# Patient Record
Sex: MALE | Race: WHITE | Employment: FULL TIME | URBAN - METROPOLITAN AREA
[De-identification: names, ages, dates, MRNs, and addresses within clinical notes are randomized per-mention and may not be internally consistent; named-entity substitution may affect disease eponyms.]

---

## 2019-06-15 ENCOUNTER — APPOINTMENT (OUTPATIENT)
Dept: CT IMAGING | Age: 34
End: 2019-06-15
Attending: EMERGENCY MEDICINE
Payer: COMMERCIAL

## 2019-06-15 ENCOUNTER — HOSPITAL ENCOUNTER (OUTPATIENT)
Age: 34
Setting detail: OBSERVATION
Discharge: HOME | End: 2019-06-17
Attending: EMERGENCY MEDICINE | Admitting: HOSPITALIST
Payer: COMMERCIAL

## 2019-06-15 DIAGNOSIS — R10.31 RLQ ABDOMINAL PAIN: Primary | ICD-10-CM

## 2019-06-15 PROBLEM — K50.00 ILEITIS, TERMINAL (HCC): Status: ACTIVE | Noted: 2019-06-15

## 2019-06-15 LAB
ALBUMIN SERPL-MCNC: 4.3 G/DL (ref 3.5–5)
ALBUMIN/GLOB SERPL: 1.5 {RATIO} (ref 1.2–3.5)
ALP SERPL-CCNC: 43 U/L (ref 50–136)
ALT SERPL-CCNC: 23 U/L (ref 12–65)
ANION GAP SERPL CALC-SCNC: 8 MMOL/L (ref 7–16)
AST SERPL-CCNC: 14 U/L (ref 15–37)
BASOPHILS # BLD: 0 K/UL (ref 0–0.2)
BASOPHILS NFR BLD: 1 % (ref 0–2)
BILIRUB SERPL-MCNC: 0.2 MG/DL (ref 0.2–1.1)
BUN SERPL-MCNC: 20 MG/DL (ref 6–23)
CALCIUM SERPL-MCNC: 9.3 MG/DL (ref 8.3–10.4)
CHLORIDE SERPL-SCNC: 107 MMOL/L (ref 98–107)
CO2 SERPL-SCNC: 26 MMOL/L (ref 21–32)
CREAT SERPL-MCNC: 1.26 MG/DL (ref 0.8–1.5)
DIFFERENTIAL METHOD BLD: NORMAL
EOSINOPHIL # BLD: 0.1 K/UL (ref 0–0.8)
EOSINOPHIL NFR BLD: 1 % (ref 0.5–7.8)
ERYTHROCYTE [DISTWIDTH] IN BLOOD BY AUTOMATED COUNT: 12.3 % (ref 11.9–14.6)
GLOBULIN SER CALC-MCNC: 2.9 G/DL (ref 2.3–3.5)
GLUCOSE SERPL-MCNC: 100 MG/DL (ref 65–100)
HCT VFR BLD AUTO: 42 % (ref 41.1–50.3)
HGB BLD-MCNC: 14.6 G/DL (ref 13.6–17.2)
IMM GRANULOCYTES # BLD AUTO: 0 K/UL (ref 0–0.5)
IMM GRANULOCYTES NFR BLD AUTO: 0 % (ref 0–5)
LIPASE SERPL-CCNC: 177 U/L (ref 73–393)
LYMPHOCYTES # BLD: 1.3 K/UL (ref 0.5–4.6)
LYMPHOCYTES NFR BLD: 15 % (ref 13–44)
MCH RBC QN AUTO: 29 PG (ref 26.1–32.9)
MCHC RBC AUTO-ENTMCNC: 34.8 G/DL (ref 31.4–35)
MCV RBC AUTO: 83.3 FL (ref 79.6–97.8)
MONOCYTES # BLD: 0.7 K/UL (ref 0.1–1.3)
MONOCYTES NFR BLD: 8 % (ref 4–12)
NEUTS SEG # BLD: 6.6 K/UL (ref 1.7–8.2)
NEUTS SEG NFR BLD: 76 % (ref 43–78)
NRBC # BLD: 0 K/UL (ref 0–0.2)
PLATELET # BLD AUTO: 234 K/UL (ref 150–450)
PMV BLD AUTO: 11.3 FL (ref 9.4–12.3)
POTASSIUM SERPL-SCNC: 4.2 MMOL/L (ref 3.5–5.1)
PROT SERPL-MCNC: 7.2 G/DL (ref 6.3–8.2)
RBC # BLD AUTO: 5.04 M/UL (ref 4.23–5.6)
SODIUM SERPL-SCNC: 141 MMOL/L (ref 136–145)
WBC # BLD AUTO: 8.7 K/UL (ref 4.3–11.1)

## 2019-06-15 PROCEDURE — 83690 ASSAY OF LIPASE: CPT

## 2019-06-15 PROCEDURE — 81003 URINALYSIS AUTO W/O SCOPE: CPT | Performed by: EMERGENCY MEDICINE

## 2019-06-15 PROCEDURE — 74011636320 HC RX REV CODE- 636/320: Performed by: EMERGENCY MEDICINE

## 2019-06-15 PROCEDURE — 96367 TX/PROPH/DG ADDL SEQ IV INF: CPT

## 2019-06-15 PROCEDURE — 96374 THER/PROPH/DIAG INJ IV PUSH: CPT | Performed by: EMERGENCY MEDICINE

## 2019-06-15 PROCEDURE — 65270000029 HC RM PRIVATE

## 2019-06-15 PROCEDURE — 80053 COMPREHEN METABOLIC PANEL: CPT

## 2019-06-15 PROCEDURE — 85025 COMPLETE CBC W/AUTO DIFF WBC: CPT

## 2019-06-15 PROCEDURE — 96375 TX/PRO/DX INJ NEW DRUG ADDON: CPT | Performed by: EMERGENCY MEDICINE

## 2019-06-15 PROCEDURE — 96365 THER/PROPH/DIAG IV INF INIT: CPT

## 2019-06-15 PROCEDURE — 96361 HYDRATE IV INFUSION ADD-ON: CPT

## 2019-06-15 PROCEDURE — 74177 CT ABD & PELVIS W/CONTRAST: CPT

## 2019-06-15 PROCEDURE — 74011250636 HC RX REV CODE- 250/636: Performed by: EMERGENCY MEDICINE

## 2019-06-15 PROCEDURE — 74011000258 HC RX REV CODE- 258: Performed by: EMERGENCY MEDICINE

## 2019-06-15 PROCEDURE — 74011000258 HC RX REV CODE- 258: Performed by: HOSPITALIST

## 2019-06-15 PROCEDURE — 99284 EMERGENCY DEPT VISIT MOD MDM: CPT | Performed by: EMERGENCY MEDICINE

## 2019-06-15 RX ORDER — SODIUM CHLORIDE 0.9 % (FLUSH) 0.9 %
10 SYRINGE (ML) INJECTION
Status: COMPLETED | OUTPATIENT
Start: 2019-06-15 | End: 2019-06-15

## 2019-06-15 RX ORDER — ONDANSETRON 2 MG/ML
4 INJECTION INTRAMUSCULAR; INTRAVENOUS
Status: DISCONTINUED | OUTPATIENT
Start: 2019-06-15 | End: 2019-06-17 | Stop reason: HOSPADM

## 2019-06-15 RX ORDER — CIPROFLOXACIN 2 MG/ML
400 INJECTION, SOLUTION INTRAVENOUS ONCE
Status: COMPLETED | OUTPATIENT
Start: 2019-06-15 | End: 2019-06-15

## 2019-06-15 RX ORDER — METRONIDAZOLE 500 MG/100ML
500 INJECTION, SOLUTION INTRAVENOUS
Status: COMPLETED | OUTPATIENT
Start: 2019-06-15 | End: 2019-06-15

## 2019-06-15 RX ORDER — METRONIDAZOLE 500 MG/100ML
500 INJECTION, SOLUTION INTRAVENOUS EVERY 8 HOURS
Status: DISCONTINUED | OUTPATIENT
Start: 2019-06-16 | End: 2019-06-17

## 2019-06-15 RX ORDER — HYDROCODONE BITARTRATE AND ACETAMINOPHEN 5; 325 MG/1; MG/1
1 TABLET ORAL
Status: DISCONTINUED | OUTPATIENT
Start: 2019-06-15 | End: 2019-06-15

## 2019-06-15 RX ORDER — SODIUM CHLORIDE 0.9 % (FLUSH) 0.9 %
5-40 SYRINGE (ML) INJECTION EVERY 8 HOURS
Status: DISCONTINUED | OUTPATIENT
Start: 2019-06-15 | End: 2019-06-17 | Stop reason: HOSPADM

## 2019-06-15 RX ORDER — SODIUM CHLORIDE 0.9 % (FLUSH) 0.9 %
5-40 SYRINGE (ML) INJECTION AS NEEDED
Status: DISCONTINUED | OUTPATIENT
Start: 2019-06-15 | End: 2019-06-17 | Stop reason: HOSPADM

## 2019-06-15 RX ORDER — CIPROFLOXACIN 2 MG/ML
400 INJECTION, SOLUTION INTRAVENOUS EVERY 12 HOURS
Status: DISCONTINUED | OUTPATIENT
Start: 2019-06-16 | End: 2019-06-17

## 2019-06-15 RX ORDER — NALOXONE HYDROCHLORIDE 0.4 MG/ML
0.4 INJECTION, SOLUTION INTRAMUSCULAR; INTRAVENOUS; SUBCUTANEOUS AS NEEDED
Status: DISCONTINUED | OUTPATIENT
Start: 2019-06-15 | End: 2019-06-15

## 2019-06-15 RX ORDER — KETOROLAC TROMETHAMINE 15 MG/ML
15 INJECTION, SOLUTION INTRAMUSCULAR; INTRAVENOUS
Status: DISCONTINUED | OUTPATIENT
Start: 2019-06-15 | End: 2019-06-17 | Stop reason: HOSPADM

## 2019-06-15 RX ORDER — KETOROLAC TROMETHAMINE 30 MG/ML
15 INJECTION, SOLUTION INTRAMUSCULAR; INTRAVENOUS
Status: COMPLETED | OUTPATIENT
Start: 2019-06-15 | End: 2019-06-15

## 2019-06-15 RX ORDER — DICYCLOMINE HYDROCHLORIDE 20 MG/1
20 TABLET ORAL
Status: DISCONTINUED | OUTPATIENT
Start: 2019-06-15 | End: 2019-06-17 | Stop reason: HOSPADM

## 2019-06-15 RX ORDER — GABAPENTIN 800 MG/1
800 TABLET ORAL 3 TIMES DAILY
COMMUNITY

## 2019-06-15 RX ORDER — ACETAMINOPHEN 325 MG/1
650 TABLET ORAL
Status: DISCONTINUED | OUTPATIENT
Start: 2019-06-15 | End: 2019-06-17 | Stop reason: HOSPADM

## 2019-06-15 RX ORDER — MORPHINE SULFATE 2 MG/ML
2 INJECTION, SOLUTION INTRAMUSCULAR; INTRAVENOUS
Status: DISCONTINUED | OUTPATIENT
Start: 2019-06-15 | End: 2019-06-15

## 2019-06-15 RX ORDER — DIPHENHYDRAMINE HYDROCHLORIDE 50 MG/ML
12.5 INJECTION, SOLUTION INTRAMUSCULAR; INTRAVENOUS
Status: DISCONTINUED | OUTPATIENT
Start: 2019-06-15 | End: 2019-06-17 | Stop reason: HOSPADM

## 2019-06-15 RX ORDER — DEXTROSE MONOHYDRATE AND SODIUM CHLORIDE 5; .9 G/100ML; G/100ML
125 INJECTION, SOLUTION INTRAVENOUS CONTINUOUS
Status: DISCONTINUED | OUTPATIENT
Start: 2019-06-15 | End: 2019-06-17 | Stop reason: HOSPADM

## 2019-06-15 RX ADMIN — SODIUM CHLORIDE 100 ML: 900 INJECTION, SOLUTION INTRAVENOUS at 21:14

## 2019-06-15 RX ADMIN — KETOROLAC TROMETHAMINE 15 MG: 30 INJECTION, SOLUTION INTRAMUSCULAR at 19:47

## 2019-06-15 RX ADMIN — CIPROFLOXACIN 400 MG: 2 INJECTION, SOLUTION INTRAVENOUS at 22:26

## 2019-06-15 RX ADMIN — DEXTROSE MONOHYDRATE AND SODIUM CHLORIDE 125 ML/HR: 5; .9 INJECTION, SOLUTION INTRAVENOUS at 23:00

## 2019-06-15 RX ADMIN — Medication 10 ML: at 21:14

## 2019-06-15 RX ADMIN — SODIUM CHLORIDE 1000 ML: 900 INJECTION, SOLUTION INTRAVENOUS at 19:47

## 2019-06-15 RX ADMIN — METRONIDAZOLE 500 MG: 500 INJECTION, SOLUTION INTRAVENOUS at 22:26

## 2019-06-15 RX ADMIN — IOPAMIDOL 100 ML: 755 INJECTION, SOLUTION INTRAVENOUS at 21:14

## 2019-06-15 NOTE — ED TRIAGE NOTES
Pt complains of generalized abd pain. Pt holding RLQ. Pt reports the pain started at darryl 0400 and has progressed with nausea, no vomiting. Pt reports having a large bowel movement today and states he still has his appendix.

## 2019-06-15 NOTE — ED PROVIDER NOTES
34 yo male with RLQ abdominal pain. States pain began early this morning in his mid abdomen just below his umbilicus. Denies any fevers or chills, states nausea without vomiting. No pain with urination, no blood in urine, no chest pain or SOB, no further complaints. Abdominal Pain    Associated symptoms include nausea. Pertinent negatives include no fever, no diarrhea, no vomiting, no dysuria, no headaches, no chest pain and no back pain. Past Medical History:   Diagnosis Date    Neurological disorder     migraines       Past Surgical History:   Procedure Laterality Date    HX HEENT      tonsils         History reviewed. No pertinent family history.     Social History     Socioeconomic History    Marital status:      Spouse name: Not on file    Number of children: Not on file    Years of education: Not on file    Highest education level: Not on file   Occupational History    Not on file   Social Needs    Financial resource strain: Not on file    Food insecurity:     Worry: Not on file     Inability: Not on file    Transportation needs:     Medical: Not on file     Non-medical: Not on file   Tobacco Use    Smoking status: Never Smoker   Substance and Sexual Activity    Alcohol use: No    Drug use: Yes     Types: Marijuana    Sexual activity: Not on file   Lifestyle    Physical activity:     Days per week: Not on file     Minutes per session: Not on file    Stress: Not on file   Relationships    Social connections:     Talks on phone: Not on file     Gets together: Not on file     Attends Mosque service: Not on file     Active member of club or organization: Not on file     Attends meetings of clubs or organizations: Not on file     Relationship status: Not on file    Intimate partner violence:     Fear of current or ex partner: Not on file     Emotionally abused: Not on file     Physically abused: Not on file     Forced sexual activity: Not on file   Other Topics Concern    Not on file   Social History Narrative    Not on file         ALLERGIES: Pcn [penicillins] and Sulfa (sulfonamide antibiotics)    Review of Systems   Constitutional: Negative for chills and fever. HENT: Negative for rhinorrhea and sore throat. Eyes: Negative for visual disturbance. Respiratory: Negative for cough and shortness of breath. Cardiovascular: Negative for chest pain and leg swelling. Gastrointestinal: Positive for abdominal pain and nausea. Negative for diarrhea and vomiting. Genitourinary: Negative for dysuria. Musculoskeletal: Negative for back pain and neck pain. Skin: Negative for rash. Neurological: Negative for weakness and headaches. Psychiatric/Behavioral: The patient is not nervous/anxious. Vitals:    06/15/19 1924   BP: 143/74   Pulse: 73   Resp: 17   Temp: 97.6 °F (36.4 °C)   SpO2: 99%   Weight: 90.7 kg (200 lb)   Height: 5' 9\" (1.753 m)            Physical Exam   Constitutional: He is oriented to person, place, and time. He appears well-developed and well-nourished. HENT:   Head: Normocephalic. Right Ear: External ear normal.   Left Ear: External ear normal.   Eyes: Pupils are equal, round, and reactive to light. Conjunctivae and EOM are normal.   Neck: Normal range of motion. Neck supple. No tracheal deviation present. Cardiovascular: Normal rate, regular rhythm, normal heart sounds and intact distal pulses. No murmur heard. Pulmonary/Chest: Effort normal and breath sounds normal. No respiratory distress. Abdominal: Soft. There is tenderness (Pain to palpation in RLQ/Periumbilical region without rebound). No pain to palpation in RUQ or through-out otherwise. Negative vazquez sign. Musculoskeletal: Normal range of motion. Neurological: He is alert and oriented to person, place, and time. No cranial nerve deficit. Skin: No rash noted. Nursing note and vitals reviewed.        MDM       Procedures    Recent Results (from the past 12 hour(s))   CBC WITH AUTOMATED DIFF    Collection Time: 06/15/19  7:56 PM   Result Value Ref Range    WBC 8.7 4.3 - 11.1 K/uL    RBC 5.04 4.23 - 5.6 M/uL    HGB 14.6 13.6 - 17.2 g/dL    HCT 42.0 41.1 - 50.3 %    MCV 83.3 79.6 - 97.8 FL    MCH 29.0 26.1 - 32.9 PG    MCHC 34.8 31.4 - 35.0 g/dL    RDW 12.3 11.9 - 14.6 %    PLATELET 472 291 - 338 K/uL    MPV 11.3 9.4 - 12.3 FL    ABSOLUTE NRBC 0.00 0.0 - 0.2 K/uL    DF AUTOMATED      NEUTROPHILS 76 43 - 78 %    LYMPHOCYTES 15 13 - 44 %    MONOCYTES 8 4.0 - 12.0 %    EOSINOPHILS 1 0.5 - 7.8 %    BASOPHILS 1 0.0 - 2.0 %    IMMATURE GRANULOCYTES 0 0.0 - 5.0 %    ABS. NEUTROPHILS 6.6 1.7 - 8.2 K/UL    ABS. LYMPHOCYTES 1.3 0.5 - 4.6 K/UL    ABS. MONOCYTES 0.7 0.1 - 1.3 K/UL    ABS. EOSINOPHILS 0.1 0.0 - 0.8 K/UL    ABS. BASOPHILS 0.0 0.0 - 0.2 K/UL    ABS. IMM. GRANS. 0.0 0.0 - 0.5 K/UL   METABOLIC PANEL, COMPREHENSIVE    Collection Time: 06/15/19  7:56 PM   Result Value Ref Range    Sodium 141 136 - 145 mmol/L    Potassium 4.2 3.5 - 5.1 mmol/L    Chloride 107 98 - 107 mmol/L    CO2 26 21 - 32 mmol/L    Anion gap 8 7 - 16 mmol/L    Glucose 100 65 - 100 mg/dL    BUN 20 6 - 23 MG/DL    Creatinine 1.26 0.8 - 1.5 MG/DL    GFR est AA >60 >60 ml/min/1.73m2    GFR est non-AA >60 >60 ml/min/1.73m2    Calcium 9.3 8.3 - 10.4 MG/DL    Bilirubin, total 0.2 0.2 - 1.1 MG/DL    ALT (SGPT) 23 12 - 65 U/L    AST (SGOT) 14 (L) 15 - 37 U/L    Alk. phosphatase 43 (L) 50 - 136 U/L    Protein, total 7.2 6.3 - 8.2 g/dL    Albumin 4.3 3.5 - 5.0 g/dL    Globulin 2.9 2.3 - 3.5 g/dL    A-G Ratio 1.5 1.2 - 3.5     LIPASE    Collection Time: 06/15/19  7:56 PM   Result Value Ref Range    Lipase 177 73 - 393 U/L     Ct Abd Pelv W Cont    Result Date: 6/15/2019  CT abdomen and pelvis with contrast COMPARISON: None . INDICATION: Generalized abdominal pain. Right lower quadrant pain. . TECHNIQUE: CT imaging was performed of the abdomen and pelvis following the uncomplicated administration of intravenous contrast (Isovue 370, 100 mL). Oral contrast was administered. Radiation dose reduction techniques were used for this study:  Our CT scanners use one or all of the following: Automated exposure control, adjustment of the mA and/or kVp according to patient's size, iterative reconstruction. FINDINGS: Visualized lung bases are unremarkable. Abdomen findings: There is mild periportal edema, nonspecific. No focal liver lesion. Gallbladder is contracted. The pancreas, spleen, adrenal glands, and the left kidney are unremarkable. There is a tiny right renal cyst. No hydronephrosis. Abdominal aorta is normal in course and caliber. Stomach is normal in contour. There is no evidence of lymphadenopathy. Pelvic findings: There is subtle focal bowel wall thickening at the ileocecal junction, resulting in narrowing. There is a short segment bowel dilatation of the terminal ileum, containing feces. Appendix appears within normal limits, though not well visualized. The colon is normal in course and caliber with moderate stool volume. Urinary bladder is normal in contour. There is no free air or free fluid. Surrounding bones are intact. IMPRESSION: 1. Subtle mild wall thickening at the ileocecal junction, resulting in luminal narrowing. Findings suspicious for mild terminal ileitis. Short segment dilated terminal ileum, containing feces (small bowel feces sign), suggestive of partial obstruction. 2. Appendix appears within normal limits, although not well visualized. Negative for diverticulitis or hydronephrosis. 36 yo male with abdominal pain:       Discussed with Dr. Kaitlin Bowers who is on for surgery and states non-surgical at this time and requests admission to hospitalist with GI consult and will follow along. Started Cipro/flagyl in ED.

## 2019-06-16 LAB
AMPHET UR QL SCN: NEGATIVE
ANION GAP SERPL CALC-SCNC: 7 MMOL/L (ref 7–16)
BARBITURATES UR QL SCN: NEGATIVE
BASOPHILS # BLD: 0 K/UL (ref 0–0.2)
BASOPHILS NFR BLD: 0 % (ref 0–2)
BENZODIAZ UR QL: NEGATIVE
BUN SERPL-MCNC: 16 MG/DL (ref 6–23)
CALCIUM SERPL-MCNC: 9 MG/DL (ref 8.3–10.4)
CANNABINOIDS UR QL SCN: NEGATIVE
CHLORIDE SERPL-SCNC: 108 MMOL/L (ref 98–107)
CO2 SERPL-SCNC: 27 MMOL/L (ref 21–32)
COCAINE UR QL SCN: NEGATIVE
CREAT SERPL-MCNC: 0.92 MG/DL (ref 0.8–1.5)
CRP SERPL-MCNC: <0.3 MG/DL (ref 0–0.9)
DIFFERENTIAL METHOD BLD: ABNORMAL
EOSINOPHIL # BLD: 0.1 K/UL (ref 0–0.8)
EOSINOPHIL NFR BLD: 2 % (ref 0.5–7.8)
ERYTHROCYTE [DISTWIDTH] IN BLOOD BY AUTOMATED COUNT: 12.3 % (ref 11.9–14.6)
ERYTHROCYTE [SEDIMENTATION RATE] IN BLOOD: 6 MM/HR (ref 0–15)
GLUCOSE SERPL-MCNC: 114 MG/DL (ref 65–100)
HCT VFR BLD AUTO: 39.3 % (ref 41.1–50.3)
HGB BLD-MCNC: 13.7 G/DL (ref 13.6–17.2)
IMM GRANULOCYTES # BLD AUTO: 0 K/UL (ref 0–0.5)
IMM GRANULOCYTES NFR BLD AUTO: 0 % (ref 0–5)
LACTATE SERPL-SCNC: 0.9 MMOL/L (ref 0.4–2)
LYMPHOCYTES # BLD: 2 K/UL (ref 0.5–4.6)
LYMPHOCYTES NFR BLD: 29 % (ref 13–44)
MAGNESIUM SERPL-MCNC: 2 MG/DL (ref 1.8–2.4)
MCH RBC QN AUTO: 29 PG (ref 26.1–32.9)
MCHC RBC AUTO-ENTMCNC: 34.9 G/DL (ref 31.4–35)
MCV RBC AUTO: 83.3 FL (ref 79.6–97.8)
METHADONE UR QL: NEGATIVE
MONOCYTES # BLD: 0.7 K/UL (ref 0.1–1.3)
MONOCYTES NFR BLD: 10 % (ref 4–12)
NEUTS SEG # BLD: 4 K/UL (ref 1.7–8.2)
NEUTS SEG NFR BLD: 58 % (ref 43–78)
NRBC # BLD: 0 K/UL (ref 0–0.2)
OPIATES UR QL: NEGATIVE
PCP UR QL: NEGATIVE
PLATELET # BLD AUTO: 182 K/UL (ref 150–450)
PMV BLD AUTO: 10.8 FL (ref 9.4–12.3)
POTASSIUM SERPL-SCNC: 3.8 MMOL/L (ref 3.5–5.1)
RBC # BLD AUTO: 4.72 M/UL (ref 4.23–5.6)
SODIUM SERPL-SCNC: 142 MMOL/L (ref 136–145)
WBC # BLD AUTO: 6.9 K/UL (ref 4.3–11.1)

## 2019-06-16 PROCEDURE — 74011250636 HC RX REV CODE- 250/636: Performed by: HOSPITALIST

## 2019-06-16 PROCEDURE — 80307 DRUG TEST PRSMV CHEM ANLYZR: CPT

## 2019-06-16 PROCEDURE — 83605 ASSAY OF LACTIC ACID: CPT

## 2019-06-16 PROCEDURE — 36415 COLL VENOUS BLD VENIPUNCTURE: CPT

## 2019-06-16 PROCEDURE — 96366 THER/PROPH/DIAG IV INF ADDON: CPT

## 2019-06-16 PROCEDURE — 80048 BASIC METABOLIC PNL TOTAL CA: CPT

## 2019-06-16 PROCEDURE — 85025 COMPLETE CBC W/AUTO DIFF WBC: CPT

## 2019-06-16 PROCEDURE — 74011000258 HC RX REV CODE- 258: Performed by: HOSPITALIST

## 2019-06-16 PROCEDURE — 77030020245 HC SOL INJ 5% D/0.9%NACL

## 2019-06-16 PROCEDURE — 96375 TX/PRO/DX INJ NEW DRUG ADDON: CPT

## 2019-06-16 PROCEDURE — 85652 RBC SED RATE AUTOMATED: CPT

## 2019-06-16 PROCEDURE — 96361 HYDRATE IV INFUSION ADD-ON: CPT

## 2019-06-16 PROCEDURE — 86140 C-REACTIVE PROTEIN: CPT

## 2019-06-16 PROCEDURE — 99218 HC RM OBSERVATION: CPT

## 2019-06-16 PROCEDURE — 83735 ASSAY OF MAGNESIUM: CPT

## 2019-06-16 RX ORDER — METOCLOPRAMIDE HYDROCHLORIDE 5 MG/ML
5 INJECTION INTRAMUSCULAR; INTRAVENOUS EVERY 6 HOURS
Status: DISCONTINUED | OUTPATIENT
Start: 2019-06-16 | End: 2019-06-16

## 2019-06-16 RX ADMIN — CIPROFLOXACIN 400 MG: 2 INJECTION, SOLUTION INTRAVENOUS at 10:59

## 2019-06-16 RX ADMIN — METOCLOPRAMIDE 5 MG: 5 INJECTION, SOLUTION INTRAMUSCULAR; INTRAVENOUS at 11:00

## 2019-06-16 RX ADMIN — METRONIDAZOLE 500 MG: 500 INJECTION, SOLUTION INTRAVENOUS at 07:24

## 2019-06-16 RX ADMIN — DEXTROSE MONOHYDRATE AND SODIUM CHLORIDE 125 ML/HR: 5; .9 INJECTION, SOLUTION INTRAVENOUS at 11:04

## 2019-06-16 RX ADMIN — CIPROFLOXACIN 400 MG: 2 INJECTION, SOLUTION INTRAVENOUS at 21:04

## 2019-06-16 RX ADMIN — METRONIDAZOLE 500 MG: 500 INJECTION, SOLUTION INTRAVENOUS at 14:40

## 2019-06-16 RX ADMIN — METRONIDAZOLE 500 MG: 500 INJECTION, SOLUTION INTRAVENOUS at 22:43

## 2019-06-16 RX ADMIN — DEXTROSE MONOHYDRATE AND SODIUM CHLORIDE 125 ML/HR: 5; .9 INJECTION, SOLUTION INTRAVENOUS at 21:04

## 2019-06-16 NOTE — PROGRESS NOTES
TRANSFER - IN REPORT:    Verbal report received from Isabel(name) on Andrew Warner  being received from ED(unit) for routine progression of care      Report consisted of patients Situation, Background, Assessment and   Recommendations(SBAR). Information from the following report(s) SBAR, Kardex, ED Summary, [facility] and Recent Results was reviewed with the receiving nurse. Opportunity for questions and clarification was provided. Assessment completed upon patients arrival to unit and care assumed.

## 2019-06-16 NOTE — PROGRESS NOTES
Assessment completed and dodcumented. Lung sounds clear. Heart sounds regular. Abdomen soft and tender. Bowel sounds hypoactive. Currently sleeping. Will continue to monitor with hourly rounding.

## 2019-06-16 NOTE — ED NOTES
TRANSFER - OUT REPORT:    Verbal report given to Jacqueline Turner RN(name) on Mami Brown  being transferred to Med Surg(unit) for routine progression of care       Report consisted of patients Situation, Background, Assessment and   Recommendations(SBAR). Information from the following report(s) SBAR, Kardex, ED Summary, Accordion, Recent Results and Med Rec Status was reviewed with the receiving nurse. Lines:   Peripheral IV 06/15/19 Left (Active)   Site Assessment Clean, dry, & intact 6/15/2019  7:55 PM   Dressing Status Clean, dry, & intact 6/15/2019  7:55 PM   Alcohol Cap Used Yes 6/15/2019  7:55 PM        Opportunity for questions and clarification was provided.       Patient transported with:   [facility]

## 2019-06-16 NOTE — PROGRESS NOTES
Hospitalist     Admit Date:  6/15/2019  7:27 PM   Name:  Gail Chvaez   Age:  35 y.o.  :  1985   MRN:  [id number]   PCP:  Leo Hernandez MD  Treatment Team: Attending Provider: Dagoberto Pickens MD; Consulting Provider: Tino Crain MD    subj   Patient is a 36 y/o male with no chronic medical problems who presents with acute onset RLQ pain starting at 4 am. Felt as though he had a knot and he needed to move to relieve the pain. Pain has progressed throughout the day. Has nausea but no vomiting. No diarrhea or blood in stool. No fever. He is afebrile with a WBC ct of 8.7 CT shows findings suspicious for mild terminal ileitis and suggestive of partial obstruction. He has had no suspect foods. Appendix appears within normal limits although not well visualized. He was started on IV antibiotics and Hospitalist service consulted for admission. He has no personal or family history for inflammatory bowel disease. Today, no BM or passing gas    Objective:     Patient Vitals for the past 24 hrs:   Temp Pulse Resp BP SpO2   19 1213 97.9 °F (36.6 °C) 92 16 106/61 97 %   19 0815 97.4 °F (36.3 °C) 60 16 113/73 96 %   19 0420 97.6 °F (36.4 °C) 72 16 115/75 99 %   06/15/19 2300 97.8 °F (36.6 °C) 74 18 118/78 98 %   06/15/19 2231 97.9 °F (36.6 °C) 70 14 121/72 100 %   06/15/19 2105    120/55    06/15/19 1924 97.6 °F (36.4 °C) 73 17 143/74 99 %     Oxygen Therapy  O2 Sat (%): 97 % (19 1213)  O2 Device: Room air (06/15/19 1924)    Intake/Output Summary (Last 24 hours) at 2019 1632  Last data filed at 2019 1405  Gross per 24 hour   Intake 1998 ml   Output 250 ml   Net 1748 ml       Physical Exam:  General:    Well nourished. Alert. Eyes:   Normal sclera. Extraocular movements intact. ENT:  Normocephalic, atraumatic. Moist mucous membranes  CV:   RRR. No murmur, rub, or gallop. Lungs:  CTAB. No wheezing, rhonchi, or rales.   Abdomen: Soft,diminished bs, mildly tender RLQ  Extremities: Warm and dry. No cyanosis or edema. Neurologic: CN II-XII grossly intact. Sensation intact. Skin:     No rashes or jaundice. No wounds. Psych:  Normal mood and affect. I reviewed the labs, imaging, EKGs, telemetry, and other studies done this admission. Data Review:   Recent Results (from the past 24 hour(s))   CBC WITH AUTOMATED DIFF    Collection Time: 06/15/19  7:56 PM   Result Value Ref Range    WBC 8.7 4.3 - 11.1 K/uL    RBC 5.04 4.23 - 5.6 M/uL    HGB 14.6 13.6 - 17.2 g/dL    HCT 42.0 41.1 - 50.3 %    MCV 83.3 79.6 - 97.8 FL    MCH 29.0 26.1 - 32.9 PG    MCHC 34.8 31.4 - 35.0 g/dL    RDW 12.3 11.9 - 14.6 %    PLATELET 482 263 - 346 K/uL    MPV 11.3 9.4 - 12.3 FL    ABSOLUTE NRBC 0.00 0.0 - 0.2 K/uL    DF AUTOMATED      NEUTROPHILS 76 43 - 78 %    LYMPHOCYTES 15 13 - 44 %    MONOCYTES 8 4.0 - 12.0 %    EOSINOPHILS 1 0.5 - 7.8 %    BASOPHILS 1 0.0 - 2.0 %    IMMATURE GRANULOCYTES 0 0.0 - 5.0 %    ABS. NEUTROPHILS 6.6 1.7 - 8.2 K/UL    ABS. LYMPHOCYTES 1.3 0.5 - 4.6 K/UL    ABS. MONOCYTES 0.7 0.1 - 1.3 K/UL    ABS. EOSINOPHILS 0.1 0.0 - 0.8 K/UL    ABS. BASOPHILS 0.0 0.0 - 0.2 K/UL    ABS. IMM. GRANS. 0.0 0.0 - 0.5 K/UL   METABOLIC PANEL, COMPREHENSIVE    Collection Time: 06/15/19  7:56 PM   Result Value Ref Range    Sodium 141 136 - 145 mmol/L    Potassium 4.2 3.5 - 5.1 mmol/L    Chloride 107 98 - 107 mmol/L    CO2 26 21 - 32 mmol/L    Anion gap 8 7 - 16 mmol/L    Glucose 100 65 - 100 mg/dL    BUN 20 6 - 23 MG/DL    Creatinine 1.26 0.8 - 1.5 MG/DL    GFR est AA >60 >60 ml/min/1.73m2    GFR est non-AA >60 >60 ml/min/1.73m2    Calcium 9.3 8.3 - 10.4 MG/DL    Bilirubin, total 0.2 0.2 - 1.1 MG/DL    ALT (SGPT) 23 12 - 65 U/L    AST (SGOT) 14 (L) 15 - 37 U/L    Alk.  phosphatase 43 (L) 50 - 136 U/L    Protein, total 7.2 6.3 - 8.2 g/dL    Albumin 4.3 3.5 - 5.0 g/dL    Globulin 2.9 2.3 - 3.5 g/dL    A-G Ratio 1.5 1.2 - 3.5     LIPASE    Collection Time: 06/15/19  7:56 PM Result Value Ref Range    Lipase 177 73 - 393 U/L   CBC WITH AUTOMATED DIFF    Collection Time: 06/16/19  5:03 AM   Result Value Ref Range    WBC 6.9 4.3 - 11.1 K/uL    RBC 4.72 4.23 - 5.6 M/uL    HGB 13.7 13.6 - 17.2 g/dL    HCT 39.3 (L) 41.1 - 50.3 %    MCV 83.3 79.6 - 97.8 FL    MCH 29.0 26.1 - 32.9 PG    MCHC 34.9 31.4 - 35.0 g/dL    RDW 12.3 11.9 - 14.6 %    PLATELET 570 942 - 321 K/uL    MPV 10.8 9.4 - 12.3 FL    ABSOLUTE NRBC 0.00 0.0 - 0.2 K/uL    DF AUTOMATED      NEUTROPHILS 58 43 - 78 %    LYMPHOCYTES 29 13 - 44 %    MONOCYTES 10 4.0 - 12.0 %    EOSINOPHILS 2 0.5 - 7.8 %    BASOPHILS 0 0.0 - 2.0 %    IMMATURE GRANULOCYTES 0 0.0 - 5.0 %    ABS. NEUTROPHILS 4.0 1.7 - 8.2 K/UL    ABS. LYMPHOCYTES 2.0 0.5 - 4.6 K/UL    ABS. MONOCYTES 0.7 0.1 - 1.3 K/UL    ABS. EOSINOPHILS 0.1 0.0 - 0.8 K/UL    ABS. BASOPHILS 0.0 0.0 - 0.2 K/UL    ABS. IMM.  GRANS. 0.0 0.0 - 0.5 K/UL   MAGNESIUM    Collection Time: 06/16/19  5:03 AM   Result Value Ref Range    Magnesium 2.0 1.8 - 2.4 mg/dL   LACTIC ACID    Collection Time: 06/16/19  5:03 AM   Result Value Ref Range    Lactic acid 0.9 0.4 - 2.0 MMOL/L   METABOLIC PANEL, BASIC    Collection Time: 06/16/19  5:03 AM   Result Value Ref Range    Sodium 142 136 - 145 mmol/L    Potassium 3.8 3.5 - 5.1 mmol/L    Chloride 108 (H) 98 - 107 mmol/L    CO2 27 21 - 32 mmol/L    Anion gap 7 7 - 16 mmol/L    Glucose 114 (H) 65 - 100 mg/dL    BUN 16 6 - 23 MG/DL    Creatinine 0.92 0.8 - 1.5 MG/DL    GFR est AA >60 >60 ml/min/1.73m2    GFR est non-AA >60 >60 ml/min/1.73m2    Calcium 9.0 8.3 - 10.4 MG/DL   C REACTIVE PROTEIN, QT    Collection Time: 06/16/19  5:03 AM   Result Value Ref Range    C-Reactive protein <0.3 0.0 - 0.9 mg/dL   SED RATE, AUTOMATED    Collection Time: 06/16/19  5:03 AM   Result Value Ref Range    Sed rate, automated 6 0 - 15 mm/hr   DRUG SCREEN, URINE    Collection Time: 06/16/19  3:18 PM   Result Value Ref Range    PCP(PHENCYCLIDINE) NEGATIVE       BENZODIAZEPINES NEGATIVE       COCAINE NEGATIVE       AMPHETAMINES NEGATIVE       METHADONE NEGATIVE       THC (TH-CANNABINOL) NEGATIVE       OPIATES NEGATIVE       BARBITURATES NEGATIVE          Imaging Studies:  CXR Results  (Last 48 hours)    None        CT Results  (Last 48 hours)               06/15/19 2115  CT ABD PELV W CONT Final result    Impression:  IMPRESSION:       1. Subtle mild wall thickening at the ileocecal junction, resulting in luminal   narrowing. Findings suspicious for mild terminal ileitis. Short segment dilated   terminal ileum, containing feces (small bowel feces sign), suggestive of partial   obstruction. 2. Appendix appears within normal limits, although not well visualized. Negative   for diverticulitis or hydronephrosis. Narrative:  CT abdomen and pelvis with contrast        COMPARISON: None . INDICATION: Generalized abdominal pain. Right lower quadrant pain. .       TECHNIQUE: CT imaging was performed of the abdomen and pelvis following the   uncomplicated administration of intravenous contrast (Isovue 370, 100 mL). Oral   contrast was administered. Radiation dose reduction techniques were used for   this study:  Our CT scanners use one or all of the following: Automated exposure   control, adjustment of the mA and/or kVp according to patient's size, iterative   reconstruction. FINDINGS:       Visualized lung bases are unremarkable. Abdomen findings: There is mild periportal edema, nonspecific. No focal liver lesion. Gallbladder   is contracted. The pancreas, spleen, adrenal glands, and the left kidney are   unremarkable. There is a tiny right renal cyst. No hydronephrosis. Abdominal aorta is normal in course and caliber. Stomach is normal in contour. There is no evidence of lymphadenopathy. Pelvic findings: There is subtle focal bowel wall thickening at the ileocecal junction, resulting   in narrowing.  There is a short segment bowel dilatation of the terminal ileum,   containing feces. Appendix appears within normal limits, though not well   visualized. The colon is normal in course and caliber with moderate stool   volume. Urinary bladder is normal in contour. There is no free air or free fluid. Surrounding bones are intact.                      Assessment and Plan:     Hospital Problems as of 6/16/2019 Never Reviewed          Codes Class Noted - Resolved POA    * (Principal) [facility] ([facility] ICD-10-CM: K50.00  ICD-9-CM: 555.0  6/15/2019 - Present Yes              PLAN:    abx  Liquid diet  Pain control  IVF  Colonoscopy as OP    Dispo: home in 1-2 days    Signed:  Pablo Cortez MD

## 2019-06-16 NOTE — PROGRESS NOTES
06/15/19 2300   Dual Skin Pressure Injury Assessment   Dual Skin Pressure Injury Assessment WDL   Second Care Provider (Based on [address] [place] [address]) Chasidy Umanzor RN   Skin Integumentary   Skin Integumentary (WDL) WDL   Skin Color Appropriate for ethnicity   Skin Condition/Temp Warm;Dry   Skin Integrity Intact; Tattoos (comment)   Turgor Non-tenting

## 2019-06-16 NOTE — H&P
Hospitalist H&P/Consult Note     Admit Date:  6/15/2019  7:27 PM   Name:  Porsha Mercer   Age:  35 y.o.  :  1985   MRN:  [id number]   PCP:  Unruly Patel MD  Treatment Team: Attending Provider: Vicenta Alvarez MD    HPI:   Patient is a 36 y/o male with no chronic medical problems who presents with acute onset RLQ pain starting at 4 am. Felt as though he had a knot and he needed to move to relieve the pain. Pain has progressed throughout the day. Has nausea but no vomiting. No diarrhea or blood in stool. No fever. He is afebrile with a WBC ct of 8.7 CT shows findings suspicious for mild terminal ileitis and suggestive of partial obstruction. He has had no suspect foods. Appendix appears within normal limits although not well visualized. He was started on IV antibiotics and Hospitalist service consulted for admission. He has no personal or family history for inflammatory bowel disease. 10 systems reviewed and negative except as noted in HPI. Past Medical History:   Diagnosis Date    Neurological disorder     migraines      Past Surgical History:   Procedure Laterality Date    HX HEENT      tonsils      Prior to Admission Medications   Prescriptions Last Dose Informant Patient Reported? Taking?   gabapentin (NEURONTIN) 800 mg tablet   Yes Yes   Sig: Take 800 mg by mouth three (3) times daily. Facility-Administered Medications: None     Allergies   Allergen Reactions    Pcn [Penicillins] Hives    Sulfa (Sulfonamide Antibiotics) Hives      Social History     Tobacco Use    Smoking status: Never Smoker   Substance Use Topics    Alcohol use: No      History reviewed. No pertinent family history. no fam hx of Crohn's dz or UC    There is no immunization history on file for this patient.     Objective:     Patient Vitals for the past 24 hrs:   Temp Pulse Resp BP SpO2   06/15/19 2300 97.8 °F (36.6 °C) 74 18 118/78 98 %   06/15/19 2231 97.9 °F (36.6 °C) 70 14 121/72 100 %   06/15/19 2105    120/55    06/15/19 1924 97.6 °F (36.4 °C) 73 17 143/74 99 %     Oxygen Therapy  O2 Sat (%): 98 % (06/15/19 2300)  O2 Device: Room air (06/15/19 1924)    Intake/Output Summary (Last 24 hours) at 6/15/2019 3145  Last data filed at 6/15/2019 2237  Gross per 24 hour   Intake 1200 ml   Output    Net 1200 ml       Physical Exam:  General:    Well nourished. Alert. Eyes:   Normal sclera. Extraocular movements intact. ENT:  Normocephalic, atraumatic. Moist mucous membranes  CV:   RRR. No murmur, rub, or gallop. Lungs:  CTAB. No wheezing, rhonchi, or rales. Abdomen: Soft, tender to palpation RLQ  Extremities: Warm and dry. No cyanosis or edema. Neurologic: CN II-XII grossly intact. Sensation intact. Skin:     No rashes or jaundice. No wounds. Psych:  Normal mood and affect. I reviewed the labs, imaging, EKGs, telemetry, and other studies done this admission. Data Review:   Recent Results (from the past 24 hour(s))   CBC WITH AUTOMATED DIFF    Collection Time: 06/15/19  7:56 PM   Result Value Ref Range    WBC 8.7 4.3 - 11.1 K/uL    RBC 5.04 4.23 - 5.6 M/uL    HGB 14.6 13.6 - 17.2 g/dL    HCT 42.0 41.1 - 50.3 %    MCV 83.3 79.6 - 97.8 FL    MCH 29.0 26.1 - 32.9 PG    MCHC 34.8 31.4 - 35.0 g/dL    RDW 12.3 11.9 - 14.6 %    PLATELET 348 669 - 459 K/uL    MPV 11.3 9.4 - 12.3 FL    ABSOLUTE NRBC 0.00 0.0 - 0.2 K/uL    DF AUTOMATED      NEUTROPHILS 76 43 - 78 %    LYMPHOCYTES 15 13 - 44 %    MONOCYTES 8 4.0 - 12.0 %    EOSINOPHILS 1 0.5 - 7.8 %    BASOPHILS 1 0.0 - 2.0 %    IMMATURE GRANULOCYTES 0 0.0 - 5.0 %    ABS. NEUTROPHILS 6.6 1.7 - 8.2 K/UL    ABS. LYMPHOCYTES 1.3 0.5 - 4.6 K/UL    ABS. MONOCYTES 0.7 0.1 - 1.3 K/UL    ABS. EOSINOPHILS 0.1 0.0 - 0.8 K/UL    ABS. BASOPHILS 0.0 0.0 - 0.2 K/UL    ABS. IMM.  GRANS. 0.0 0.0 - 0.5 K/UL   METABOLIC PANEL, COMPREHENSIVE    Collection Time: 06/15/19  7:56 PM   Result Value Ref Range    Sodium 141 136 - 145 mmol/L    Potassium 4.2 3.5 - 5.1 mmol/L Chloride 107 98 - 107 mmol/L    CO2 26 21 - 32 mmol/L    Anion gap 8 7 - 16 mmol/L    Glucose 100 65 - 100 mg/dL    BUN 20 6 - 23 MG/DL    Creatinine 1.26 0.8 - 1.5 MG/DL    GFR est AA >60 >60 ml/min/1.73m2    GFR est non-AA >60 >60 ml/min/1.73m2    Calcium 9.3 8.3 - 10.4 MG/DL    Bilirubin, total 0.2 0.2 - 1.1 MG/DL    ALT (SGPT) 23 12 - 65 U/L    AST (SGOT) 14 (L) 15 - 37 U/L    Alk. phosphatase 43 (L) 50 - 136 U/L    Protein, total 7.2 6.3 - 8.2 g/dL    Albumin 4.3 3.5 - 5.0 g/dL    Globulin 2.9 2.3 - 3.5 g/dL    A-G Ratio 1.5 1.2 - 3.5     LIPASE    Collection Time: 06/15/19  7:56 PM   Result Value Ref Range    Lipase 177 73 - 393 U/L       Imaging Studies:  CXR Results  (Last 48 hours)    None        CT Results  (Last 48 hours)               06/15/19 2115  CT ABD PELV W CONT Final result    Impression:  IMPRESSION:       1. Subtle mild wall thickening at the ileocecal junction, resulting in luminal   narrowing. Findings suspicious for mild terminal ileitis. Short segment dilated   terminal ileum, containing feces (small bowel feces sign), suggestive of partial   obstruction. 2. Appendix appears within normal limits, although not well visualized. Negative   for diverticulitis or hydronephrosis. Narrative:  CT abdomen and pelvis with contrast        COMPARISON: None . INDICATION: Generalized abdominal pain. Right lower quadrant pain. .       TECHNIQUE: CT imaging was performed of the abdomen and pelvis following the   uncomplicated administration of intravenous contrast (Isovue 370, 100 mL). Oral   contrast was administered. Radiation dose reduction techniques were used for   this study:  Our CT scanners use one or all of the following: Automated exposure   control, adjustment of the mA and/or kVp according to patient's size, iterative   reconstruction. FINDINGS:       Visualized lung bases are unremarkable. Abdomen findings: There is mild periportal edema, nonspecific.  No focal liver lesion. Gallbladder   is contracted. The pancreas, spleen, adrenal glands, and the left kidney are   unremarkable. There is a tiny right renal cyst. No hydronephrosis. Abdominal aorta is normal in course and caliber. Stomach is normal in contour. There is no evidence of lymphadenopathy. Pelvic findings: There is subtle focal bowel wall thickening at the ileocecal junction, resulting   in narrowing. There is a short segment bowel dilatation of the terminal ileum,   containing feces. Appendix appears within normal limits, though not well   visualized. The colon is normal in course and caliber with moderate stool   volume. Urinary bladder is normal in contour. There is no free air or free fluid. Surrounding bones are intact. Assessment and Plan:     Hospital Problems as of 6/15/2019 Never Reviewed          Codes Class Noted - Resolved POA    * (Principal) [facility], terminal ([facility] ICD-10-CM: K50.00  ICD-9-CM: 555.0  6/15/2019 - Present Yes              PLAN:  · Admit inpatient to medical bed  · Possible infectious etiology. Cannot r/o new onset Crohn's disease  · Keep NPO.  IV fluids  · IV cipro and flagyl  · Check CRP, sed rate, lactic acid  · GI consult in am  · Tylenol and toradol for pain  · No narcotics as per patient request  · SCDs for DVT prophylaxis      Estimated LOS:  2 midnights    Signed:  Eleonora Wolff MD

## 2019-06-16 NOTE — CONSULTS
[facility] Consult Note       Primary GI Physician: Fantasma    Referring Provider:  Alla Cabral Date:  6/16/2019    Admit Date:  6/15/2019    Chief Complaint:  [place]    Subjective:     History of Present Illness:  Patient is a 35 y.o. male with PMH of below, who is seen in consultation at the request of Dr. Flavio Xie for ileitis. His GI history is remarkable only for admission for gastroenteritis symptoms by Dr. Lisa Queen in 2014. He travelled for work last week and had no BM for about 5 days. This was unusual.  Then, he had several soft stools on return home. About 30 hours ago, he developed RLQ pain. This was cramping and intermittent at first, becoming increasingly severe and constant. He has only had 1 small BM since onset. He has no fever or bleeding. His pain is much better now. He has an appetite. He has had no vomiting and currently has no nausea. PMH:  Past Medical History:   Diagnosis Date    Neurological disorder     migraines       PSH:  Past Surgical History:   Procedure Laterality Date    HX HEENT      tonsils       Allergies: Allergies   Allergen Reactions    Pcn [Penicillins] Hives    Sulfa (Sulfonamide Antibiotics) Hives       Home Medications:  Prior to Admission medications    Medication Sig Start Date End Date Taking? Authorizing Provider   gabapentin (NEURONTIN) 800 mg tablet Take 800 mg by mouth three (3) times daily.    Yes Other, MD Gaby       Hospital Medications:  Current Facility-Administered Medications   Medication Dose Route Frequency    sodium chloride (NS) flush 5-40 mL  5-40 mL IntraVENous Q8H    sodium chloride (NS) flush 5-40 mL  5-40 mL IntraVENous PRN    acetaminophen (TYLENOL) tablet 650 mg  650 mg Oral Q4H PRN    dicyclomine (BENTYL) tablet 20 mg  20 mg Oral Q6H PRN    ondansetron (ZOFRAN) injection 4 mg  4 mg IntraVENous Q4H PRN    diphenhydrAMINE (BENADRYL) injection 12.5 mg  12.5 mg IntraVENous Q4H PRN    dextrose 5% and 0.9% NaCl infusion  125 mL/hr IntraVENous CONTINUOUS    ciprofloxacin (CIPRO) 400 mg in D5W IVPB (premix)  400 mg IntraVENous Q12H    metroNIDAZOLE (FLAGYL) IVPB premix 500 mg  500 mg IntraVENous Q8H    ketorolac (TORADOL) injection 15 mg  15 mg IntraVENous Q6H PRN       Social History:  Social History     Tobacco Use    Smoking status: Never Smoker   Substance Use Topics    Alcohol use: No         Family History:  History reviewed. No pertinent family history. Review of Systems:  A detailed 10 system ROS is obtained, with pertinent positives as listed above. All others are negative. Diet:  NPO    Objective:     Physical Exam:  Vitals:  Visit Vitals  /73 (BP 1 Location: Right arm, BP Patient Position: At rest)   Pulse 60   Temp 97.4 °F (36.3 °C)   Resp 16   Ht 5' 9\" (1.753 m)   Wt 90.7 kg (200 lb)   SpO2 96%   BMI 29.53 kg/m²     Gen:  Pt is alert, cooperative, no acute distress  Skin:  Extremities and face reveal no rashes. HEENT: Sclerae anicteric. Extra-occular muscles are intact. No oral ulcers. No abnormal pigmentation of the lips. The neck is supple. Cardiovascular: Regular rate and rhythm. No murmurs, gallops, or rubs. Respiratory:  Comfortable breathing with no accessory muscle use. Clear breath sounds anteriorly with no wheezes, rales, or rhonchi. GI:  Abdomen nondistended, soft, and tender in RLQ without rebound. Normal active bowel sounds. No enlargement of the liver or spleen. Fullness palpable in RLQ. Rectal:  Deferred  Musculoskeletal:  No pitting edema of the lower legs. Neurological:  Gross memory appears intact. Patient is alert and oriented. Psychiatric:  Mood appears appropriate with judgement intact. Lymphatic:  No cervical or supraclavicular adenopathy.     Laboratory:    Recent Labs     06/16/19  0503 06/15/19  1956   WBC 6.9 8.7   HGB 13.7 14.6   HCT 39.3* 42.0    234   MCV 83.3 83.3    141   K 3.8 4.2   * 107   CO2 27 26   BUN 16 20   CREA 0.92 1.26 CA 9.0 9.3   MG 2.0  --    * 100   AP  --  43*   SGOT  --  14*   ALT  --  23   TBILI  --  0.2   ALB  --  4.3   TP  --  7.2   LPSE  --  177          Assessment:     Principal Problem:    Ileitis, terminal ([address] (6/15/2019) Infectious etiology suspected. He is improving. It is possible that he has IBD, even with a normal CRP, causing 2 episodes over 5 years. Plan:     Agree with empiric abx and IVF. I will start clear liquids, and this may be advanced to low residue as tolerated. If he is feeling better and tolerating his diet tomorrow, he may be discharged in the AM.  I would suggest outpatient colonoscopy in about a month to evaluate the TI. If he is not able to tolerate his diet or control symptoms, Dr. Kimberly Lorenzo will follow up.     My Guzmán MD

## 2019-06-17 VITALS
RESPIRATION RATE: 16 BRPM | BODY MASS INDEX: 29.62 KG/M2 | HEART RATE: 74 BPM | TEMPERATURE: 97.9 F | DIASTOLIC BLOOD PRESSURE: 59 MMHG | OXYGEN SATURATION: 100 % | SYSTOLIC BLOOD PRESSURE: 114 MMHG | WEIGHT: 200 LBS | HEIGHT: 69 IN

## 2019-06-17 PROCEDURE — 96366 THER/PROPH/DIAG IV INF ADDON: CPT

## 2019-06-17 PROCEDURE — 96361 HYDRATE IV INFUSION ADD-ON: CPT

## 2019-06-17 PROCEDURE — 99218 HC RM OBSERVATION: CPT

## 2019-06-17 PROCEDURE — 74011000258 HC RX REV CODE- 258: Performed by: HOSPITALIST

## 2019-06-17 PROCEDURE — 74011250636 HC RX REV CODE- 250/636: Performed by: HOSPITALIST

## 2019-06-17 PROCEDURE — 77030032490 HC SLV COMPR SCD KNE COVD -B

## 2019-06-17 RX ORDER — POLYETHYLENE GLYCOL 3350 17 G/17G
17 POWDER, FOR SOLUTION ORAL
Qty: 3 PACKET | Refills: 0 | Status: SHIPPED | OUTPATIENT
Start: 2019-06-17

## 2019-06-17 RX ORDER — CIPROFLOXACIN 500 MG/1
500 TABLET ORAL EVERY 12 HOURS
Status: DISCONTINUED | OUTPATIENT
Start: 2019-06-17 | End: 2019-06-17 | Stop reason: HOSPADM

## 2019-06-17 RX ORDER — CIPROFLOXACIN 500 MG/1
500 TABLET ORAL EVERY 12 HOURS
Qty: 10 TAB | Refills: 0 | Status: SHIPPED | OUTPATIENT
Start: 2019-06-17

## 2019-06-17 RX ORDER — METRONIDAZOLE 500 MG/1
500 TABLET ORAL EVERY 12 HOURS
Status: DISCONTINUED | OUTPATIENT
Start: 2019-06-17 | End: 2019-06-17 | Stop reason: HOSPADM

## 2019-06-17 RX ORDER — METRONIDAZOLE 500 MG/1
500 TABLET ORAL EVERY 12 HOURS
Qty: 15 TAB | Refills: 0 | Status: SHIPPED | OUTPATIENT
Start: 2019-06-17

## 2019-06-17 RX ADMIN — CIPROFLOXACIN 400 MG: 2 INJECTION, SOLUTION INTRAVENOUS at 08:36

## 2019-06-17 RX ADMIN — METRONIDAZOLE 500 MG: 500 INJECTION, SOLUTION INTRAVENOUS at 06:07

## 2019-06-17 RX ADMIN — DEXTROSE MONOHYDRATE AND SODIUM CHLORIDE 125 ML/HR: 5; .9 INJECTION, SOLUTION INTRAVENOUS at 08:36

## 2019-06-17 NOTE — PROGRESS NOTES
Care Management Interventions  PCP Verified by CM: Yes  Mode of Transport at Discharge: Other (see comment)  Transition of Care Consult (CM Consult): Other  Current Support Network: Own Home, Lives with Spouse  Confirm Follow Up Transport: Family  Plan discussed with Pt/Family/Caregiver: Yes  Freedom of Choice Offered: Yes  Discharge Location  Discharge Placement: Home  Visited with pt regarding plans for discharge, pt lives at home/wife/indep with ADL's, has PCP and will f/u with GI as a outpatient. Per GI as of 6/16. .. Liliane Francois Pt may d/c today  Plan:      Agree with empiric abx and IVF. I will start clear liquids, and this may be advanced to low residue as tolerated.     If he is feeling better and tolerating his diet tomorrow, he may be discharged in the AM.  I would suggest outpatient colonoscopy in about a month to evaluate the TI.     If he is not able to tolerate his diet or control symptoms, Dr. Jacklyn Myles will follow up.     JENSEN Can MD    PT has been downgraded to OBS status.

## 2019-06-17 NOTE — PROGRESS NOTES
Assessment completed and documented. Lung sounds clear. Heart sounds regular. Abdomen soft and tender. Bowel sounds active. No complaints of pain. Currently resting in bed. Will continue to monitor with hourly rounding.

## 2019-06-17 NOTE — DISCHARGE INSTRUCTIONS
DISCHARGE SUMMARY from Nurse    PATIENT INSTRUCTIONS:    After general anesthesia or intravenous sedation, for 24 hours or while taking prescription Narcotics:  · Limit your activities  · Do not drive and operate hazardous machinery  · Do not make important personal or business decisions  · Do  not drink alcoholic beverages  · If you have not urinated within 8 hours after discharge, please contact your surgeon on call. Report the following to your surgeon:  · Excessive pain, swelling, redness or odor of or around the surgical area  · Temperature over 100.5  · Nausea and vomiting lasting longer than 4 hours or if unable to take medications  · Any signs of decreased circulation or nerve impairment to extremity: change in color, persistent  numbness, tingling, coldness or increase pain  · Any questions    What to do at Home:  Recommended activity: Activity as tolerated, PCP in 2-4 weeks, diet and activity as tolerated, Gi appointment for colonoscopy in 4 weeks. If you experience any of the following symptoms: worsening abdominal pain, nausea/vomiting, fever, chills, distention, constipation, other concerns, please follow up with PCP or GI. *  Please give a list of your current medications to your Primary Care Provider. *  Please update this list whenever your medications are discontinued, doses are      changed, or new medications (including over-the-counter products) are added. *  Please carry medication information at all times in case of emergency situations. These are general instructions for a healthy lifestyle:    No smoking/ No tobacco products/ Avoid exposure to second hand smoke  Surgeon General's Warning:  Quitting smoking now greatly reduces serious risk to your health.     Obesity, smoking, and sedentary lifestyle greatly increases your risk for illness    A healthy diet, regular physical exercise & weight monitoring are important for maintaining a healthy lifestyle    You may be retaining fluid if you have a history of heart failure or if you experience any of the following symptoms:  Weight gain of 3 pounds or more overnight or 5 pounds in a week, increased swelling in our hands or feet or shortness of breath while lying flat in bed. Please call your doctor as soon as you notice any of these symptoms; do not wait until your next office visit. Recognize signs and symptoms of STROKE:    F-face looks uneven    A-arms unable to move or move unevenly    S-speech slurred or non-existent    T-time-call 911 as soon as signs and symptoms begin-DO NOT go       Back to bed or wait to see if you get better-TIME IS BRAIN. Warning Signs of HEART ATTACK     Call 911 if you have these symptoms:   Chest discomfort. Most heart attacks involve discomfort in the center of the chest that lasts more than a few minutes, or that goes away and comes back. It can feel like uncomfortable pressure, squeezing, fullness, or pain.  Discomfort in other areas of the upper body. Symptoms can include pain or discomfort in one or both arms, the back, neck, jaw, or stomach.  Shortness of breath with or without chest discomfort.  Other signs may include breaking out in a cold sweat, nausea, or lightheadedness. Don't wait more than five minutes to call 911 - MINUTES MATTER! Fast action can save your life. Calling 911 is almost always the fastest way to get lifesaving treatment. Emergency Medical Services staff can begin treatment when they arrive -- up to an hour sooner than if someone gets to the hospital by car. The discharge information has been reviewed with the patient. The patient verbalized understanding. Discharge medications reviewed with the patient and appropriate educational materials and side effects teaching were provided.   ___________________________________________________________________________________________________________________________________    Bowel Blockage (Intestinal Obstruction): Care Instructions  Your Care Instructions  A bowel blockage, also called an intestinal obstruction, can prevent gas, fluids, or solids from moving through the intestines normally. It can cause constipation and, rarely, diarrhea. You may have pain, nausea, vomiting, and cramping. Most of the time, complete blockages require a stay in the hospital and possibly surgery. But if your bowel is only partly blocked, your doctor may tell you to wait until it clears on its own and you are able to pass gas and stool. If so, there are things you can do at home to help make you feel better. If you have had surgery for a bowel blockage, there are things you can do at home to make sure you heal well. You can also make some changes to keep your bowel from becoming blocked again. Follow-up care is a key part of your treatment and safety. Be sure to make and go to all appointments, and call your doctor if you are having problems. It's also a good idea to know your test results and keep a list of the medicines you take. How can you care for yourself at home? If your doctor has told you to wait at home for a blockage to clear on its own:  · Follow your doctor's instructions. These may include eating a liquid diet to avoid complete blockage. · Be safe with medicines. Take your medicines exactly as prescribed. Call your doctor if you think you are having a problem with your medicine. · Put a heating pad set on low on your belly to relieve mild cramps and pain. To prevent another blockage  · Try to eat smaller amounts of food more often. For example, have 5 or 6 small meals throughout the day instead of 2 or 3 large meals. · Chew your food very well. Try to chew each bite about 20 times or until it is liquid. · Avoid high-fiber foods and raw fruits and vegetables with skins, husks, strings, or seeds.  These can form a ball of undigested material that can cause a blockage if a part of your bowel is scarred or narrowed. · Check with your doctor before you eat whole-grain products or use a fiber supplement such as Citrucel or Metamucil. · To help you have regular bowel movements, eat at regular times, do not strain during a bowel movement, and drink at least 8 to 10 glasses of water each day. If you have kidney, heart, or liver disease and have to limit fluids, talk with your doctor or before you increase the amount of fluids you drink. · Drink high-calorie liquid formulas if your doctor says to. Severe symptoms may make it hard for your body to take in vitamins and minerals. · Get regular exercise. It helps you digest your food better. Get at least 30 minutes of physical activity on most days of the week. Walking is a good choice. When should you call for help? Call your doctor now or seek immediate medical care if:    · You have a fever.     · You are vomiting.     · You have new or worse belly pain.     · You cannot pass stools or gas.    Watch closely for changes in your health, and be sure to contact your doctor if you have any problems. Where can you learn more? Go to http://keeley-parker.info/. Enter X062 in the search box to learn more about \"Bowel Blockage (Intestinal Obstruction): Care Instructions. \"  Current as of: March 27, 2018  Content Version: 11.9  © 1262-0874 [employer]. Care instructions adapted under license by [employer] (which disclaims liability or warranty for this information). If you have questions about a medical condition or this instruction, always ask your healthcare professional. [place] [address] any warranty or liability for your use of this information. Low-Fiber Diet: Care Instructions  Your Care Instructions    When your bowels are irritated, you may need to limit fiber in your diet until the problem clears up.  Your doctor and dietitian can help you design a low-fiber diet based on your health and what you prefer to eat. Ask your doctor how long you should stay on a low-fiber diet. Your doctor probably will have you start adding more fiber to your diet as you get better. Always talk with your doctor or dietitian before you make changes in your diet. Follow-up care is a key part of your treatment and safety. Be sure to make and go to all appointments, and call your doctor if you are having problems. It's also a good idea to know your test results and keep a list of the medicines you take. How can you care for yourself at home? · Choose white or refined grains, and avoid whole grains. That means eating white or refined cereals, breads, crackers, rice, or pasta. · Peel the skin from fruits and vegetables before you eat or cook them. Avoid eating skins, seeds, and hulls. ? Eat frozen or canned fruit. Low-fiber fruits include applesauce, ripe bananas, and fruit juice without pulp. ? Eat low-fiber vegetables, which include well-cooked vegetables and vegetable juice. · Drink or eat milk, yogurt, or other milk products, if you can digest dairy without too many problems. Your doctor may limit milk and milk products for a while. If so, he or she may recommend a calcium and vitamin D supplement. · Eat well-cooked meat, such as chicken, turkey, fish, or lean meat. You also can eat eggs and tofu. · Avoid these foods:  ? Bran, brown or wild rice, oatmeal, granola, corn, joanna crackers, barley, and whole wheat and other whole-grain breads, such as rye bread  ? Cereals with more than 3 grams of fiber a serving  ? Berries, rhubarb, prunes, prune juice, and all dried fruits  ? Raw vegetables  ? Cabbage, broccoli, brussels sprouts, and cauliflower  ? Cooked dried beans, lentils, and split peas  ? Crunchy peanut butter  ? Ice cream with fruit pieces in it  ? Coconut, nuts, popcorn, raisins, and seeds, or any ice cream, yogurt, or cheese with these in them  Where can you learn more?   Go to http://keeley-parker.info/. Enter U853 in the search box to learn more about \"Low-Fiber Diet: Care Instructions. \"  Current as of: March 28, 2018  Content Version: 11.9  © 1477-7709 [employer]. Care instructions adapted under license by [employer] (which disclaims liability or warranty for this information). If you have questions about a medical condition or this instruction, always ask your healthcare professional. [address] any warranty or liability for your use of this information.

## 2019-06-17 NOTE — DISCHARGE SUMMARY
Physician Discharge Summary     Patient ID:  Britney Groves  [id number]  73 y.o.  1985    Admit date: 6/15/2019    Discharge date: 6-    Diagnosis:  Possible acute ileitis with partial bowel obstruction, treated with supportive treatment, IVF, Cipro and Flagyl. Improved and passed gas. Asymptomatic on discharge. Seen by Gi who plan colonosopy as outpatient due to a similar episode about 5 years ago. CT abdomen and pelvis with contrast:  FINDINGS:  Visualized lung bases are unremarkable. Abdomen findings: There is mild periportal edema, nonspecific. No focal liver lesion. Gallbladder  is contracted. The pancreas, spleen, adrenal glands, and the left kidney are  unremarkable. There is a tiny right renal cyst. No hydronephrosis. Abdominal aorta is normal in course and caliber. Stomach is normal in contour. There is no evidence of lymphadenopathy. Pelvic findings: There is subtle focal bowel wall thickening at the ileocecal junction, resulting  in narrowing. There is a short segment bowel dilatation of the terminal ileum,  containing feces. Appendix appears within normal limits, though not well  visualized. The colon is normal in course and caliber with moderate stool  volume. Urinary bladder is normal in contour. There is no free air or free fluid. Surrounding bones are intact. IMPRESSION:  1. Subtle mild wall thickening at the ileocecal junction, resulting in luminal  narrowing. Findings suspicious for mild terminal ileitis. Short segment dilated  terminal ileum, containing feces (small bowel feces sign), suggestive of partial  obstruction. 2. Appendix appears within normal limits, although not well visualized. Negative  for diverticulitis or hydronephrosis. Hospital course:   35 y.o. male with PMH of below, who is seen in consultation at the request of Dr. Marjan Diaz for ileitis. His GI history is remarkable only for admission for gastroenteritis symptoms by Dr. Shanell Pablo in 2014.   He travelled for work last week and had no BM for about 5 days. This was unusual.  Then, he had several soft stools on return home. About 30 hours ago, he developed RLQ pain. This was cramping and intermittent at first, becoming increasingly severe and constant. He has only had 1 small BM since onset. He has no fever or bleeding. His pain is much better now. He has an appetite. He has had no vomiting and currently has no nausea. Patient admitted and treated as above. On day of discharge, patient exam showed benign abdomen and pt is asymptomatic. Tolerated regular diet. PCP: Gregory Cartagena MD    Patient Instructions:   Current Discharge Medication List      START taking these medications    Details   ciprofloxacin HCl (CIPRO) 500 mg tablet Take 1 Tab by mouth every twelve (12) hours. Qty: 10 Tab, Refills: 0      metroNIDAZOLE (FLAGYL) 500 mg tablet Take 1 Tab by mouth every twelve (12) hours. Qty: 15 Tab, Refills: 0      polyethylene glycol (MIRALAX) 17 gram packet Take 1 Packet by mouth daily as needed for Other (constipation). Qty: 3 Packet, Refills: 0         CONTINUE these medications which have NOT CHANGED    Details   gabapentin (NEURONTIN) 800 mg tablet Take 800 mg by mouth three (3) times daily. Instructions:     PCP in 2-4 weeks, diet and activity as tolerated, Gi appointment for colonoscopy in 4 weeks. Condition: Stable  Time 20 min    Please send copy to primary physician.     Signed:  Pb Morales MD  6/17/2019  1:33 PM

## 2019-06-17 NOTE — PROGRESS NOTES
Spiritual Care visit. Initial Visit,      Patient was not in his room.  learned that he had been discharged.     Visit by Venice Manuel M.Ed., Th.B. ,Staff

## 2019-06-17 NOTE — PHYSICIAN ADVISORY
Letter of Determination:  Outpatient Status receiving Observation Services This case was reviewed, and I concur with Outpatient status receiving Observation services. There is insufficient documentd medical necessity to support inpatient care. This determination may change depending on further medical information, condition changes of the patient, or treatment requirements. Ivis Saucedo MD, JODY, Physician Advisor [address] [place] [address].